# Patient Record
(demographics unavailable — no encounter records)

---

## 2025-05-30 NOTE — LETTER BODY
[FreeTextEntry1] : Laura Smith MD [address] [facility], [address] [place], [place], [id number] [phone]  Dear Dr. Smith,  Reason for visit: Prostate Cancer. Dysuria. Orchitis.   This is a 84 year old man with previous orchitis presenting today with prostate cancer s/p radical prostatectomy 2011 and dysuria. His cystoscopy in March 2024 demonstrated prominent verumontanum. Patient reports of biochemical failure. He was on hormone therapy for unknown reason. Patient returns for followup. Since he was last seen, his last PSA was 0.12. He complains of dysuria. However, his orchitis is stable. His previous ultrasound was negative. Patient denies any changes in health. The past medical history and family history and social history are unchanged. All other review of systems are negative. Patient denies any changes in medications. Medication list was reconciled.  On examination, the patient is in no acute distress. He is alert and oriented follows commands. He has normal mood and affect. He is normocephalic. Oral no thrush. Neck is supple. Respirations are unlabored. His abdomen is soft and nontender. Liver is nonpalpable. Bladder is nonpalpable. No CVA tenderness. Neurologically he is grossly intact. No peripheral edema. Skin without gross abnormality.  His most recent PSA is rising, 0.12.    Assessment: Prostate Cancer. Dysuria. Orchitis.  Rising PSA after prostate surgery.  I counseled the patient. In terms of his prostate cancer, patient has rising PSA, currently at 0.12. I recommended the patient repeat PSA and BMP to ensure stability. He may consider obtaining a PET/CT scan if his PSA continues to rise. In terms of his dysuria, I discussed the etiologies of dysuria. I recommended the patient obtain urinalysis, urine culture, and urine cytology to evaluate for infections and high-grade urothelial carcinoma. In terms of his orchitis, his symptoms are stable. His previous scrotal ultrasound was negative. I reassured the patient. The risks and benefits were discussed. Alternatives were given. I answered the patient questions. The patient will follow-up as directed and will contact me with any questions or concerns. Thank you for the opportunity to participate in the care of this patient. I'll keep you updated on his progress.  Plan: PSA. BMP. Urinalysis. Urine cytology. Urine culture. Consider PET/CT scan. Follow up as directed.

## 2025-05-30 NOTE — ADDENDUM
[FreeTextEntry1] :  Entered by Blanca Hoang, acting as scribe for Dr Carlos Nair. The documentation recorded by the scribe accurately reflects the service I personally performed and the decisions made by me.

## 2025-05-30 NOTE — LETTER BODY
[FreeTextEntry1] : Laura Smith MD [address] [facility], [address] [place], [place], [id number] [phone]  Dear Dr. Smith,  Reason for visit: Prostate Cancer. Dysuria. Orchitis.   This is a 84 year old man with previous orchitis presenting today with prostate cancer s/p radical prostatectomy 2011 and dysuria. His cystoscopy in March 2024 demonstrated prominent verumontanum. Patient reports of biochemical failure. He was on hormone therapy for unknown reason. Patient returns for followup. Since he was last seen, his last PSA was 0.12. He complains of dysuria. However, his orchitis is stable. His previous ultrasound was negative. Patient denies any changes in health. The past medical history and family history and social history are unchanged. All other review of systems are negative. Patient denies any changes in medications. Medication list was reconciled.  On examination, the patient is in no acute distress. He is alert and oriented follows commands. He has normal mood and affect. He is normocephalic. Oral no thrush. Neck is supple. Respirations are unlabored. His abdomen is soft and nontender. Liver is nonpalpable. Bladder is nonpalpable. No CVA tenderness. Neurologically he is grossly intact. No peripheral edema. Skin without gross abnormality.  His most recent PSA is rising, 0.12.    Assessment: Prostate Cancer. Dysuria. Orchitis.  Rising PSA after prostate surgery.  I counseled the patient. In terms of his prostate cancer, patient has rising PSA, currently at 0.12. I recommended the patient repeat PSA and BMP to ensure stability. He may consider obtaining a PET/CT scan if his PSA continues to rise. In terms of his dysuria, I discussed the etiologies of dysuria. I recommended the patient obtain urinalysis, urine culture, and urine cytology to evaluate for infections and high-grade urothelial carcinoma. In terms of his orchitis, his symptoms are stable. His previous scrotal ultrasound was negative. I reassured the patient. The risks and benefits were discussed. Alternatives were given. I answered the patient questions. The patient will follow-up as directed and will contact me with any questions or concerns. Thank you for the opportunity to participate in the care of this patient. I'll keep you updated on his progress.  Plan: PSA. BMP. Urinalysis. Urine cytology. Urine culture. Consider PET/CT scan. Follow up as directed.

## 2025-07-28 NOTE — HISTORY OF PRESENT ILLNESS
[FreeTextEntry1] : Patient is a 85 year old M with ?prostate cancer s/p radical prostatectomy 2011 in [place], CVA 2006, HTN and HLD who presents for scrotal and penile pain.  Acute walk in appt for pain and LUTS. Pain is actually chronic. He reports a few months history of urinary frequency, DTF q 2-3 hr, nocturia every 1 hour, scrotal pain, penile pain prior to voiding and subsided afterwards, small amount urine output. intermittent pelvic incision pain. urge urinary incontinence, wear pad x1-2. slow weak stream, hesitancy, straining to void, incomplete emptying sensation.  He was prescribed tamsulosin 0.4 mg daily and myrbetriq previously. He appears to have chronic pelvic pain as he has seen Dr Nair multiple times over past 1-2 years for similar issues/complaints.  PSA 0.23 5/31/25 Urine culture negative on 6/1/25 Cysto by Dr Nair 3/2024 - unrevealing ?residual veru PET scan 7/16/25 shows normal appearance of prostatectomy site, notable rib findings - is planned for Chest CT per Dr Nair.

## 2025-07-28 NOTE — PHYSICAL EXAM
[Normal Appearance] : normal appearance [Well Groomed] : well groomed [General Appearance - In No Acute Distress] : no acute distress [Edema] : no peripheral edema [Respiration, Rhythm And Depth] : normal respiratory rhythm and effort [Exaggerated Use Of Accessory Muscles For Inspiration] : no accessory muscle use [Abdomen Soft] : soft [Abdomen Tenderness] : non-tender [Costovertebral Angle Tenderness] : no ~M costovertebral angle tenderness [Urinary Bladder Findings] : the bladder was normal on palpation [Scrotum] : the scrotum was normal [Testes Tenderness] : no tenderness of the testes [Testes Mass (___cm)] : there were no testicular masses [Normal Station and Gait] : the gait and station were normal for the patient's age [] : no rash [No Focal Deficits] : no focal deficits [Oriented To Time, Place, And Person] : oriented to person, place, and time [Affect] : the affect was normal [Mood] : the mood was normal [No Palpable Adenopathy] : no palpable adenopathy [Chaperone Present] : A chaperone was present in the examining room during all aspects of the physical examination [de-identified] : small L inguinal hernia defect palpable, no bulge [FreeTextEntry2] :  Christa Richardson NP

## 2025-07-28 NOTE — ASSESSMENT
[FreeTextEntry1] : Patient is a 84 year old M with ?prostate cancer s/p radical prostatectomy 2011 in [place], CVA 2006, HTN and HLD who presents for scrotal and penile pain and LUTS.  These symptoms appear chronic in duration Exam is unrevealing, except for small L hernia -- d/w pt and grandson hernia on exam but do not suspect hernia is cause of pain. D/w pt that he can see Gen surgery to evaluate if further surgical treatment is necessary or observation Prior evaluation with labs and imaging also grossly unrevealing.  Recent PET CT without pelvic abnormalities and no evidence of CaP recurrence.  PSAs have been very slowly rising.  On surveillance with Dr Nair D/w pt and grandson he should see pain specialist/neurologist Cont prn OTC tylenol and IBU which is helpful per pt F/u with Dr Nair